# Patient Record
Sex: FEMALE | Race: WHITE | NOT HISPANIC OR LATINO | Employment: STUDENT
[De-identification: names, ages, dates, MRNs, and addresses within clinical notes are randomized per-mention and may not be internally consistent; named-entity substitution may affect disease eponyms.]

---

## 2017-03-07 ENCOUNTER — CHARTING TRANS (OUTPATIENT)
Dept: OTHER | Age: 22
End: 2017-03-07

## 2017-03-07 LAB — RAPID STREP GROUP A: NORMAL

## 2017-03-08 ENCOUNTER — LAB SERVICES (OUTPATIENT)
Dept: OTHER | Age: 22
End: 2017-03-08

## 2017-03-12 LAB — RESPIRATORY CULTURE (RTC) HL: NORMAL

## 2017-05-26 ENCOUNTER — LAB SERVICES (OUTPATIENT)
Dept: OTHER | Age: 22
End: 2017-05-26

## 2017-05-26 ENCOUNTER — CHARTING TRANS (OUTPATIENT)
Dept: OTHER | Age: 22
End: 2017-05-26

## 2017-05-26 LAB — RAPID STREP GROUP A: NORMAL

## 2017-05-31 ENCOUNTER — CHARTING TRANS (OUTPATIENT)
Dept: OTHER | Age: 22
End: 2017-05-31

## 2017-05-31 LAB — ANER/AEROBE CUL/SMR (AANC) HL: NORMAL

## 2017-08-04 ENCOUNTER — CHARTING TRANS (OUTPATIENT)
Dept: OTHER | Age: 22
End: 2017-08-04

## 2017-09-18 ENCOUNTER — LAB SERVICES (OUTPATIENT)
Dept: OTHER | Age: 22
End: 2017-09-18

## 2017-09-19 ENCOUNTER — CHARTING TRANS (OUTPATIENT)
Dept: OTHER | Age: 22
End: 2017-09-19

## 2017-09-19 LAB — TSH SERPL-ACNC: 3.56 MCUNITS/ML (ref 0.35–5)

## 2018-11-03 VITALS
DIASTOLIC BLOOD PRESSURE: 72 MMHG | HEIGHT: 64 IN | TEMPERATURE: 98.1 F | SYSTOLIC BLOOD PRESSURE: 110 MMHG | HEART RATE: 78 BPM | BODY MASS INDEX: 24.08 KG/M2 | WEIGHT: 141.06 LBS | RESPIRATION RATE: 16 BRPM

## 2018-11-03 VITALS
SYSTOLIC BLOOD PRESSURE: 104 MMHG | HEART RATE: 64 BPM | DIASTOLIC BLOOD PRESSURE: 74 MMHG | TEMPERATURE: 98.4 F | WEIGHT: 140 LBS | HEIGHT: 64 IN | RESPIRATION RATE: 17 BRPM | BODY MASS INDEX: 23.9 KG/M2

## 2018-11-05 VITALS
HEIGHT: 64 IN | TEMPERATURE: 98.4 F | DIASTOLIC BLOOD PRESSURE: 80 MMHG | WEIGHT: 140 LBS | HEART RATE: 80 BPM | BODY MASS INDEX: 23.9 KG/M2 | RESPIRATION RATE: 16 BRPM | SYSTOLIC BLOOD PRESSURE: 100 MMHG

## 2022-05-10 ENCOUNTER — APPOINTMENT (OUTPATIENT)
Dept: LAB | Age: 27
End: 2022-05-10

## 2022-05-10 ENCOUNTER — CLERICAL ORDERS (OUTPATIENT)
Dept: SCHEDULING | Age: 27
End: 2022-05-10

## 2022-05-10 DIAGNOSIS — Z01.812 PRE-PROCEDURAL LABORATORY EXAMINATION: Primary | ICD-10-CM

## 2022-05-12 PROCEDURE — 88304 TISSUE EXAM BY PATHOLOGIST: CPT | Performed by: CLINICAL MEDICAL LABORATORY

## 2022-05-12 PROCEDURE — 88311 DECALCIFY TISSUE: CPT | Performed by: CLINICAL MEDICAL LABORATORY

## 2022-05-13 ENCOUNTER — LAB REQUISITION (OUTPATIENT)
Dept: LAB | Age: 27
End: 2022-05-13

## 2022-05-16 LAB
ASR DISCLAIMER: NORMAL
CASE RPRT: NORMAL
CLINICAL INFO: NORMAL
PATH REPORT.FINAL DX SPEC: NORMAL
PATH REPORT.GROSS SPEC: NORMAL

## 2024-09-17 ENCOUNTER — HOSPITAL ENCOUNTER (OUTPATIENT)
Age: 29
Discharge: ANOTHER HOSPITAL | End: 2024-09-17
Payer: COMMERCIAL

## 2024-09-17 VITALS
DIASTOLIC BLOOD PRESSURE: 83 MMHG | TEMPERATURE: 97 F | RESPIRATION RATE: 16 BRPM | HEART RATE: 101 BPM | SYSTOLIC BLOOD PRESSURE: 120 MMHG | OXYGEN SATURATION: 100 %

## 2024-09-17 DIAGNOSIS — R00.2 PALPITATIONS: ICD-10-CM

## 2024-09-17 DIAGNOSIS — R00.0 TACHYCARDIA: Primary | ICD-10-CM

## 2024-09-17 PROCEDURE — 99205 OFFICE O/P NEW HI 60 MIN: CPT | Performed by: NURSE PRACTITIONER

## 2024-09-17 PROCEDURE — 93000 ELECTROCARDIOGRAM COMPLETE: CPT | Performed by: NURSE PRACTITIONER

## 2024-09-17 RX ORDER — DEXTROAMPHETAMINE SACCHARATE, AMPHETAMINE ASPARTATE, DEXTROAMPHETAMINE SULFATE AND AMPHETAMINE SULFATE 7.5; 7.5; 7.5; 7.5 MG/1; MG/1; MG/1; MG/1
30 TABLET ORAL DAILY
COMMUNITY

## 2024-09-17 NOTE — ED PROVIDER NOTES
Patient Seen in: [facility]      History     Chief Complaint   Patient presents with    Dizziness     Stated Complaint: Heart Palpitations; Dizzy; other    Subjective:   HPI    29-year-old female presents to immediate care with complaints of heart palpitations and feeling dizzy.  She states she felt her heart racing this morning.  She wears an Apple Watch and states her heart rate went anywhere from 104 as high as 140.  She states when it gets that high she becomes very dizzy.  There was no syncope.  There is no chest pain.  She does have some mild nausea.  But there is been no vomiting.  This has happened once in the past.  She states her doctor told her it was anxiety.  Patient denies heavy caffeine use.  She denies illicit drugs.    Objective:   History reviewed. No pertinent past medical history.           History reviewed. No pertinent surgical history.             Social History     Socioeconomic History    Marital status: Single   Tobacco Use    Smoking status: Never    Smokeless tobacco: Never   Vaping Use    Vaping status: Never Used   Substance and Sexual Activity    Alcohol use: Not Currently    Drug use: Never              Review of Systems    Positive for stated Chief Complaint: Dizziness    Other systems are as noted in HPI.  Constitutional and vital signs reviewed.      All other systems reviewed and negative except as noted above.    Physical Exam     ED Triage Vitals [09/17/24 1726]   /83   Pulse 101   Resp 16   Temp 97 °F (36.1 °C)   Temp src Temporal   SpO2 100 %   O2 Device None (Room air)       Current Vitals:   Vital Signs  BP: 120/83  Pulse: 101  Resp: 16  Temp: 97 °F (36.1 °C)  Temp src: Temporal    Oxygen Therapy  SpO2: 100 %  O2 Device: None (Room air)            Physical Exam  Vitals reviewed.   Constitutional:       General: She is not in acute distress.     Appearance: She is not ill-appearing.   Cardiovascular:      Rate and Rhythm: Regular rhythm. Tachycardia  present.   Pulmonary:      Effort: Pulmonary effort is normal.      Breath sounds: Normal breath sounds.   Abdominal:      Palpations: Abdomen is soft.   Musculoskeletal:         General: Normal range of motion.      Cervical back: Normal range of motion and neck supple.   Skin:     General: Skin is warm and dry.   Neurological:      Mental Status: She is alert.   Psychiatric:         Mood and Affect: Mood normal. Mood is not anxious or depressed.         Speech: Speech normal.         Behavior: Behavior normal.               ED Course   Labs Reviewed - No data to display  EKG    EKG 12 Lead        Component Value Flag Ref Range Units Status    Ventricular rate 96       BPM Incomplete    Atrial rate 96       BPM Incomplete    P-R Interval 136       ms Incomplete    QRS Duration 76       ms Incomplete    Q-T Interval 356       ms Incomplete    QTC Calculation (Bezet) 449       ms Incomplete    P Axis -17       degrees Incomplete    R Axis -12       degrees Incomplete    T Axis -10       degrees Incomplete                 Normal sinus rhythm with sinus arrhythmia  Normal ECG  No previous ECGs found in [facility]                               MDM                                         Medical Decision Making  29-year-old female presents with tachycardia and palpitations.  Differential diagnosis includes cardiac arrhythmia, PE, thyroid dysfunction, illicit drug use, excessive caffeine use.  Patient reports her heart rate has been up and down all day.  She states that went as high as 140 at which time she became dizzy.  There was never any syncope.  She denies chest pain.  She denies feeling short of breath.  EKG was done and shows normal sinus rhythm rate is 96.  EKG was reviewed with Dr. Cotto there were some unusual findings.  There is no prior MUSE to compare.  Given the fact that patient has had intermittent symptomatic tachycardia throughout the day it is best patient be transferred to the emergency room for continuous  monitoring.  Unable to PERC patient due to tachycardia.  PE is of some concern.  Evaluation and treatment options were discussed with patient.  She is agreeable to go to the emergency department.  She will be driven there by her friend.    Amount and/or Complexity of Data Reviewed  ECG/medicine tests: ordered.     Details: EKG was done   Shows NSR   Rate is 96   There is no prior in MUSE to compare.         Disposition and Plan     Clinical Impression:  1. Tachycardia    2. Palpitations         Disposition:  Ic to ed  9/17/2024  6:33 pm    Follow-up:  No follow-up provider specified.        Medications Prescribed:  Current Discharge Medication List

## 2024-09-18 LAB
ATRIAL RATE: 96 BPM
P AXIS: -17 DEGREES
P-R INTERVAL: 136 MS
Q-T INTERVAL: 356 MS
QRS DURATION: 76 MS
QTC CALCULATION (BEZET): 449 MS
R AXIS: -12 DEGREES
T AXIS: -10 DEGREES
VENTRICULAR RATE: 96 BPM